# Patient Record
Sex: MALE | Race: WHITE | Employment: FULL TIME | URBAN - METROPOLITAN AREA
[De-identification: names, ages, dates, MRNs, and addresses within clinical notes are randomized per-mention and may not be internally consistent; named-entity substitution may affect disease eponyms.]

---

## 2017-01-01 ENCOUNTER — APPOINTMENT (OUTPATIENT)
Dept: CT IMAGING | Age: 52
DRG: 896 | End: 2017-01-01
Attending: EMERGENCY MEDICINE
Payer: COMMERCIAL

## 2017-01-01 PROBLEM — Z91.81 AT RISK FOR FALLING: Status: ACTIVE | Noted: 2017-01-01

## 2017-01-01 PROBLEM — F10.10 ALCOHOL ABUSE: Status: ACTIVE | Noted: 2017-01-01

## 2017-01-01 PROBLEM — K85.20 ALCOHOL-INDUCED ACUTE PANCREATITIS, UNSPECIFIED COMPLICATION STATUS: Status: ACTIVE | Noted: 2017-01-01

## 2017-01-01 PROBLEM — K85.20 ALCOHOL-INDUCED ACUTE PANCREATITIS: Status: ACTIVE | Noted: 2017-01-01

## 2017-01-01 PROBLEM — G40.909 SEIZURE DISORDER (HCC): Status: ACTIVE | Noted: 2017-01-01

## 2017-01-01 PROCEDURE — 74176 CT ABD & PELVIS W/O CONTRAST: CPT

## 2017-01-01 NOTE — CONSULTS
[facility]    Benedicto Flight Patient Status:  Inpatient    3/7/1965 MRN [id number]   [facility] 4SW-A Attending Pernell Castleman, MD   [facility] Day # 0 PCP Ashu Stephenson OD, OD     Date of Admission: 2017  Admission Diagnosis: Alcohol facility-administered medications:   •  0.9%  NaCl infusion, , Intravenous, Continuous  •  HYDROmorphone HCl PF (DILAUDID) 1 MG/ML injection 0.5 mg, 0.5 mg, Intravenous, Q30 Min PRN  •  LORazepam (ATIVAN) injection 1 mg, 1 mg, Intravenous, Once  •  influen (77.565 kg)  02/04/14 : 180 lb (81.647 kg)                General appearance: alert, appears stated age and cooperative  Head: Normocephalic, without obvious abnormality, atraumatic  Neck: no adenopathy, no carotid bruit, no JVD and supple, symmetrical, tr

## 2017-01-01 NOTE — PROGRESS NOTES
Brief GI Note, full consult to follow    Acute EtOH pancreatitis  Tachy, hypertensive, afebrile. No clear sepsis or SIRS to attribute to pancreatitis, but this will be tough to follow given his withdrawals.     Physical Exam  NAD, alert  Decreased breath s

## 2017-01-01 NOTE — ED NOTES
Pt with notable shaking of hands. Pt refused blanket. Denies alcohol consumption/withdrawl. Discussed shaking with patient who states \"fear of needles\".

## 2017-01-01 NOTE — ED NOTES
PT states he typically drinks a 12 pack of beer. Last drink was \"middle of the night\" in between vomiting episodes.

## 2017-01-01 NOTE — H&P
[facility]  History and Physical     Edith Jerome Patient Status:  Inpatient    3/7/1965 MRN [id number]   [facility] 4SW-A Attending Shelby Villaseñor MD   Hosp Day # 0 PCP Vimal Dupont, OD, OD     Chief Complaint: Patient presen completed. Pertinent positives and negatives noted in the HPI.     Physical Exam:    /103 mmHg  Pulse 110  Temp(Src) 99.3 °F (37.4 °C) (Temporal)  Resp 19  Ht 170.2 cm (5' 7\")  Wt 171 lb (77.565 kg)  BMI 26.78 kg/m2  SpO2 95%  General: No acute di full  · Annie: no    Plan of care discussed with the ER physician    Bird Cash MD  1/1/2017

## 2017-01-01 NOTE — ED INITIAL ASSESSMENT (HPI)
Vomiting since last noc. Pt c/o abdominal cramping. Last normal bowel movement 12/31. No diarrhea. No fever.

## 2017-01-01 NOTE — PLAN OF CARE
GASTROINTESTINAL - ADULT    • Maintains or returns to baseline bowel function Not Progressing    • Maintains adequate nutritional intake (undernourished) Not Progressing        Impaired Functional Mobility    • Achieve highest/safest level of mobility/gait

## 2017-01-01 NOTE — ED PROVIDER NOTES
Patient Seen in: THE [facility] In [place]    History   Patient presents with:  Nausea/Vomiting/Diarrhea (gastrointestinal)  Abdomen/Flank Pain (GI/)    Stated Complaint: vomiting, abd pain    HPI    23-year-old male with a history of h abd pain  Other systems are as noted in HPI. Constitutional and vital signs reviewed. All other systems reviewed and negative except as noted above. PSFH elements reviewed from today and agreed except as otherwise stated in HPI.     Physical Exam components within normal limits   ETHYL ALCOHOL - Abnormal; Notable for the following:     Ethyl Alcohol 4 (*)     All other components within normal limits   CBC W/ DIFFERENTIAL - Abnormal; Notable for the following:     WBC 18.1 (*)     .4 (*) cramping and vomiting that started last PM.   FINDINGS:  This scan performed without IV or oral contrast, with limitations thereof. At the lung base, no sign of pleural effusion, pneumonia, significant atelectasis. Normal heart size.  No significant perica of the right adrenal gland.  Left adrenal gland shows some inflammatory stranding from the pancreatitis, but otherwise appears intrinsically normal. Limited assessment of the urinary bladder which contains only a small amount of urine at the time of scannin MRI followup. 6. Thickened appearance of the left colon; this may reflect adjacent pancreatitis secondarily affecting the colon, or colitis independent of the pancreatitis.   Dictated by: Cielo Márquez MD on 1/01/2017 at 11:24     Approved by: Ayde Godfrey

## 2017-01-02 ENCOUNTER — APPOINTMENT (OUTPATIENT)
Dept: GENERAL RADIOLOGY | Facility: HOSPITAL | Age: 52
DRG: 896 | End: 2017-01-02
Attending: NURSE PRACTITIONER
Payer: COMMERCIAL

## 2017-01-02 PROCEDURE — 71010 XR CHEST AP PORTABLE  (CPT=71010): CPT

## 2017-01-02 NOTE — BH PROGRESS NOTE
Went to see the pt for the shanda assessment. Pt is not assessable today will check on him tomorrow.

## 2017-01-02 NOTE — PLAN OF CARE
GASTROINTESTINAL - ADULT    • Minimal or absence of nausea and vomiting Progressing        NEUROLOGICAL - ADULT    • Achieves stable or improved neurological status Progressing    • Absence of seizures Progressing        Assumed care of patient after recei

## 2017-01-02 NOTE — PAYOR COMM NOTE
Attending Physician: Allen Long MD    Review Type: ADMISSION   Reviewer: Harpal Smith       Date: January 2, 2017 - 11:05 AM  Payor: [employer]  Authorization Number: [id number]  Admit date: 1/1/2017 10:21 AM   Admitted from Emergency Dept.:neo appearance of the distal esophagus. Possible small hiatal hernia. Old posterior left rib fractures.      Findings present consistent with acute pancreatitis.  Swollen, edematous appearance of the pancreas, involving the pancreatic body, and to a lesser exte scanning. Moderate stool the colon. No signs of colonic obstruction, small bowel structure, or free air. The gallbladder appears contracted.      Low-attenuation masslike area seen in the central liver, for example image 24 measuring 4.9 x 4.4 CM.  The marcin ADMINISTERED IN LAST 1 DAY:  Enoxaparin Sodium (LOVENOX) 40 MG/0.4ML injection 40 mg     Date Action Dose Route User    1/2/2017 0911 Given 40 mg Subcutaneous (Left Lower Abdomen) Krissy Hernandez RN    1/1/2017 1536 Given 40 mg Subcutaneous (Right Lower A 1/2/2017 0519 Given 25 mg Oral Alfredo Jacome, EVAN    1/1/2017 1752 Given 25 mg Oral Mayra Ruff, EVAN      ondansetron HCl [facility]) injection 4 mg     Date Action Dose Route User    1/1/2017 2200 Given 4 mg Intravenous Yanira Marino, RN          RES WBC 18.2 (*)     RBC 4.13 (*)     .0 (*)     .6 (*)     MCH 36.6 (*)     RDW-SD 57.3 (*)     Neutrophil Absolute Prelim 16.71 (*)     Neutrophil Absolute 16.71 (*)     Lymphocyte Absolute 0.41 (*)     Monocyte Absolute 0.88 (*)     All oth B12, MMA and folate        :

## 2017-01-02 NOTE — CONSULTS
Gastroenterology Initial Consultation  I have personally seen and examined the patient.     Patient Name: Geovanna Violeta  Referring physician: Dr Martina Boxer  Reason for consultation: pancreatitis  CC: abdominal pain  HPI: Mr Safia Hinojosa is a 50 yo male with history depression, anxiety, suicidal ideation, or homicidal ideation           Oncologic: The patient reports on history of prior solid tumor or hematologic malignancy           ENT: The patient reports no hoarseness of voice, hearing loss, sinus congestion, tinn reviewed  Inflammatory changes noted around the body and tail without obvious necrosis (limited by lack of CT)    Impression: Mr Perri Vann is a 50 yo alcoholic, here with acute EtOH pancreatitis and is high risk for withdrawals (history of withdrawals previo

## 2017-01-02 NOTE — CM/SW NOTE
Patient admitted for ETOH pancreatitis. [facility] will see for resources. MSW will defer for now but will remain available for any dc planning needs that may arise.     Alvin Vanegas LCSW

## 2017-01-02 NOTE — PROGRESS NOTES
Pulmonary Progress Note        NAME: Anton Blojuliet - ROOM: 816/469-D - MRN: [id number] - Age: 49year old - : 3/7/1965        SUBJECTIVE: No events overnight, seems confused this AM    OBJECTIVE:   17  0600 17  0700 17  0800 17 01/02/17  0511   ALT 28 19   AST 40 33   ALB 3.4* 2.7*       Invalid input(s): ARTERIALPH, ARTERIALPO2, ARTERIALPCO2, ARTERIALHCO3    No results for input(s): BNP in the last 72 hours.     Invalid input(s): TROPI      INR   Date/Time Value Ref Range Status

## 2017-01-02 NOTE — PROGRESS NOTES
[facility]  Progress Note    Gabe Mcclellan Patient Status:  Inpatient    3/7/1965 MRN [id number]   [facility] 4SW-A Attending Humphrey Sainz MD   [facility] Day # 1 PCP Savannah Doe, OD, OD     Subjective:  Gabe Mcclellan is a(n) 49 yea tomorrow  NPO for now  Watch and manage for withdrawal  IV fluid and electrolyte replacement  Repeat CMP, CBC in AM    Total time spent in the care of the patient today: 35 minutes.     Conchita Bullock  1/2/2017  10:56 AM

## 2017-01-02 NOTE — PROGRESS NOTES
[facility]  Progress Note     Serena Zarco Patient Status:  Inpatient    3/7/1965 MRN [id number]   [facility] [place] 4SW-A Attending Gagan Zapata, [address] [facility] [address] Day # 1 PCP Arron Farrar, OD, OD     Chief Complaint: pancreatitis    S: Armen Pritchardcy HCl  25 mg Oral TID   • nicotine  1 patch Transdermal Daily   • metoprolol tartrate  25 mg Oral 2x Daily(Beta Blocker)       ASSESSMENT / PLAN:     1. Etoh abuse and w/d  1. CIWA protocol  2. Librium  3. May need precedex drip  2.  Acute Etoh Induced Pancre

## 2017-01-03 ENCOUNTER — APPOINTMENT (OUTPATIENT)
Dept: CT IMAGING | Facility: HOSPITAL | Age: 52
DRG: 896 | End: 2017-01-03
Attending: INTERNAL MEDICINE
Payer: COMMERCIAL

## 2017-01-03 PROCEDURE — 74170 CT ABD WO CNTRST FLWD CNTRST: CPT

## 2017-01-03 NOTE — PROGRESS NOTES
[facility]  Progress Note     Arnell Cockayne Patient Status:  Inpatient    3/7/1965 MRN [id number]   [facility] 4SW-A Attending Zena Mccartney, [address] [facility] [address] Day # 2 PCP Vasiliy Schmitz, OD, OD     Chief Complaint: pancreatitis    S: Lorenda Sandra levETIRAcetam (KEPPRA) IVPB  250 mg Intravenous Q12H   • metoprolol Tartrate  5 mg Intravenous Q6H   • enoxaparin  40 mg Subcutaneous Daily   • famoTIDine  20 mg Per NG Tube BID    Or   • famoTIDine  20 mg Intravenous BID   • multivitamin/thiamine/folic ac

## 2017-01-03 NOTE — BH PROGRESS NOTE
Talked with the patients nurse and she said the pt is still on precedex. Unable to assess and will check on him tomorrow.

## 2017-01-03 NOTE — PROGRESS NOTES
[facility]  Progress Note    Leno Go Patient Status:  Inpatient    3/7/1965 MRN [id number]   [facility] 4SW-A Attending Rick Madrid MD   [address] Day # 2 PCP Serg Leblanc, OD, OD     Subjective:  Leno Go is a(n) 49 yea pancreatitis with suggestion of necrosis, etoh induced  2. Etoh abuse  3.  Etoh withdrawal    Plan:  NPO for now  Manage for withdrawal  IV fluid and electrolyte replacement  Will follow peripherally  CT pancreas protocol in 1 month  Call if any questions

## 2017-01-03 NOTE — PLAN OF CARE
NEUROLOGICAL - ADULT    • Achieves stable or improved neurological status Not Progressing          CARDIOVASCULAR - ADULT    • Maintains optimal cardiac output and hemodynamic stability Progressing        NEUROLOGICAL - ADULT    • Absence of seizures Progr

## 2017-01-03 NOTE — PROGRESS NOTES
Critical Care Progress Note        NAME: Carmelita Soulier - ROOM: 597/079-Q - MRN: [id number] - Age: 49year old - : 3/7/1965  Date of Admission: 2017 10:21 AM  Admission Diagnosis: Alcohol abuse [F10.10]  Seizure disorder ([facility] [address] [G40.909]  Alcohol-luana **OR** HYDROmorphone HCl PF     Lungs: clear to auscultation bilaterally  Heart: S1, S2 normal, no murmur, click, rub or gallop, regular rate and rhythm  Abdomen: soft, non-tender; bowel sounds normal; no masses,  no organomegaly  Extremities: extremities

## 2017-01-03 NOTE — PLAN OF CARE
GASTROINTESTINAL - ADULT    • Maintains or returns to baseline bowel function Not Progressing    • Maintains adequate nutritional intake (undernourished) Not Progressing          CARDIOVASCULAR - ADULT    • Maintains optimal cardiac output and hemodynamic

## 2017-01-04 NOTE — PLAN OF CARE
CARDIOVASCULAR - ADULT    • Maintains optimal cardiac output and hemodynamic stability Progressing    • Absence of cardiac arrhythmias or at baseline Progressing        GASTROINTESTINAL - ADULT    • Minimal or absence of nausea and vomiting Progressing

## 2017-01-04 NOTE — BH PROGRESS NOTE
Went to see the pt and his fiancee was at the bedside. The pt was moaning and asking about his underwear. Patient is not alert enough to complete the shanda comprehensive assessment. Will check on him tomorrow. The nurse is aware.

## 2017-01-04 NOTE — PROGRESS NOTES
[facility]    Nahed Maravilla Patient Status:  Inpatient    3/7/1965 MRN [id number]   [facility] 4SW-A Attending Amy Chinchilla [facility] Day # 3 PCP Myrna Munguia, OD, OD     Pulm / Critical Care Progress Note     S: remains on tenderness.  Dec bs   Extremity: no edema         Recent Labs   Lab  01/01/17   1042  01/02/17   0511  01/03/17   0441   WBC  18.1*  18.2*  11.8   HGB  16.3  15.1  11.8*   HCT  47.3  43.2  35.1*   PLT  243.0  136.0*  113.0*     Recent Labs   Lab  01/01/17

## 2017-01-04 NOTE — PLAN OF CARE
CARDIOVASCULAR - ADULT    • Maintains optimal cardiac output and hemodynamic stability Progressing        GASTROINTESTINAL - ADULT    • Minimal or absence of nausea and vomiting Progressing        NEUROLOGICAL - ADULT    • Achieves stable or improved neuro

## 2017-01-04 NOTE — PAYOR COMM NOTE
Attending Physician: Laury Bates*    Review Type: CONTINUED STAY  Reviewer: Cesar Solares     Date: January 4, 2017 - 11:49 AM  [facility]  Authorization Number: [id number]  Admit date: 1/1/2017 10:21 AM        1/3/17    Physi

## 2017-01-04 NOTE — PROGRESS NOTES
[facility]  Progress Note    Chase Mendoza Patient Status:  Inpatient    3/7/1965 MRN [id number]   [facility] 4SW-A Attending Maya Maddox MD   [facility] Day # 3 PCP Natalya Crowley, OD, OD     Subjective:  Chase Mendoza is a(n) 49 yea

## 2017-01-05 NOTE — PLAN OF CARE
PT MORE CONFUSED THIS AM PER FIANCE. DELAYED ANSWERS & SOME IRRITATION NOTED. STATES HAVING ABDOMINAL PAIN. UP TO CHAIR FOR APPROX. 1 HR. UNSTEADY. [place] CATH IN PLACE.

## 2017-01-05 NOTE — PROGRESS NOTES
[facility]  Progress Note     Carmelita Soulier Patient Status:  Inpatient    3/7/1965 MRN [id number]   [facility] 4SW-A Attending [facility] Day # 3 PCP Negra Bains, OD, OD     Chief Complaint: Pancreatitis mg Per NG Tube BID    Or   • famoTIDine  20 mg Intravenous BID   • multivitamin/thiamine/folic acid infusion   Intravenous Q24H   • ChlordiazePOXIDE HCl  25 mg Oral TID   • nicotine  1 patch Transdermal Daily       ASSESSMENT / PLAN:     1.  Etoh abuse and

## 2017-01-05 NOTE — PROGRESS NOTES
[facility]  Progress Note    Fredrick Rollins Patient Status:  Inpatient    3/7/1965 MRN [id number]   [facility] 4SW-A Attending Biju Carvajal MD   [facility] Day # 4 PCP Dav Webster, OD, OD     Subjective:  Fredrick Rollins is a(n) 49 yea pancreatitis     Alcohol-induced acute pancreatitis, unspecified complication status     Alcohol abuse     Seizure disorder ([facility] [address]     At risk for falling     Pancreatic necrosis      Impression:  1.  Acute pancreatitis with suggestion of necrosis, etoh luana

## 2017-01-05 NOTE — BH PROGRESS NOTE
Went to see the pt and he said he drinks daily but is not interested in treatment. His fiancee was at his bedside and she was given referrals for cd treatment in case he changes his mind. His nurse is aware.

## 2017-01-05 NOTE — PROGRESS NOTES
Critical Care Progress Note        NAME: Nabil Santiago - ROOM: [address] [place] [address] - MRN: [id number] - Age: 49year old - : 3/7/1965  Date of Admission: 2017 10:21 AM  Admission Diagnosis: Alcohol abuse [F10.10]  Seizure disorder ([facility] [address] [G40.909]  Alcohol-luana influenza virus vaccine PF, LORazepam, LORazepam, LORazepam, LORazepam, ondansetron HCl, Labetalol HCl, HYDROmorphone HCl PF **OR** HYDROmorphone HCl PF     Lungs: clear to auscultation bilaterally  Heart: S1, S2 normal, no murmur, click, rub or gallop, re

## 2017-01-05 NOTE — PROGRESS NOTES
[facility]  Progress Note     Emory Solis Patient Status:  Inpatient    3/7/1965 MRN [id number]   [facility] Serafin Gibson, OD, OD     Chief Complaint: Pancreatitis Followed by   • potassium chloride  20 mEq Intravenous Once   • levETIRAcetam (KEPPRA) IVPB  250 mg Intravenous Q12H   • metoprolol Tartrate  5 mg Intravenous Q6H   • enoxaparin  40 mg Subcutaneous Daily   • famoTIDine  20 mg Per NG Tube BID    Or   • famo

## 2017-01-05 NOTE — PLAN OF CARE
CARDIOVASCULAR - ADULT    • Maintains optimal cardiac output and hemodynamic stability Progressing    • Absence of cardiac arrhythmias or at baseline Progressing        GASTROINTESTINAL - ADULT    • Maintains or returns to baseline bowel function Progressi

## 2017-01-06 NOTE — PLAN OF CARE
CARDIOVASCULAR - ADULT    • Maintains optimal cardiac output and hemodynamic stability Progressing        GASTROINTESTINAL - ADULT    • Minimal or absence of nausea and vomiting Progressing        METABOLIC/FLUID AND ELECTROLYTES - ADULT    • Electrolytes

## 2017-01-06 NOTE — PAYOR COMM NOTE
Attending Physician: Laury Bates*    1/5  CONTINUED STAY    PANCREATITIS, TRANSIET NAUSEA  AFTER CLEARS LAST NITE           PHOS  3.0  01/05/2017      CT: Marked pancreatitis with suggestion of necrosis involving the pancreatic body and tail i

## 2017-01-06 NOTE — PROGRESS NOTES
[facility]  Progress Note    Nelli Dubkevontima Patient Status:  Inpatient    3/7/1965 MRN [id number]   [facility] 4SW-A Attending Roswell Sicard, MD   [facility] Day # 5 PCP Ebony Hammans, OD, OD     Subjective:  Nelli Fuentes is a(n 49 yea minutes.     Wing Berna MARTINES  [facility] Gastroenterology  1/6/2017  3:27 PM

## 2017-01-06 NOTE — PROGRESS NOTES
Received patient at 0730  Patient more alert and oriented x 4 this morning with some mild confusion  Patient following directions and cooperative this shift. Patient tolerating CLD, advanced up to soft diet, will monitor.   GI signed off on patient for [facility]

## 2017-01-06 NOTE — CM/SW NOTE
SW received order for pending PT eval and met with pt for d/c planning. Pt is a 49 y.o male admitted on 01/01 with dx of ETOH-induced pancreatitis. Pt has no previous admissions per chart review. Pt has h/o seizure d/o.  Pt is A&O with some confusion and c

## 2017-01-06 NOTE — PHYSICAL THERAPY NOTE
PHYSICAL THERAPY EVALUATION - INPATIENT     Room Number: 5834/6014-A  Evaluation Date: 1/6/2017  Type of Evaluation: Initial  Physician Order: PT Eval and Treat    Presenting Problem: etoh induced pancreatitis  Reason for Therapy: Mobility Dysfunction commands with increased time and follows one step commands with repetition  · Safety Judgement:  decreased awareness of need for assistance and decreased awareness of need for safety  · Awareness of Errors:  assistance required to identify errors made and bed to a chair (including a wheelchair)?: A Lot   -   Need to walk in hospital room?: A Lot   -   Climbing 3-5 steps with a railing?: Total     AM-PAC Score:  Raw Score: 16   PT Approx Degree of Impairment Score: 54.16%   Standardized Score (AM-PAC Scale): inpatient PT to address the above deficits to assist patient in returning to prior level of function. Subacute rehab is recommended for 12-14 days. After this period of rehabilitation patient should achieve mod ind level in all fxal mobility.       Tennis Carbine

## 2017-01-06 NOTE — PROGRESS NOTES
[facility]  Progress Note     Scott Evens Patient Status:  Inpatient    3/7/1965 MRN [id number]   [facility] 4SW-A Attending Dana Roblero [facility] Day # 5 PCP Mayte Gaines, OD, OD     Chief Complaint: Pancreatitis folic acid  1 mg Oral Daily   • levETIRAcetam (KEPPRA) IVPB  250 mg Intravenous Q12H   • metoprolol Tartrate  5 mg Intravenous Q6H   • enoxaparin  40 mg Subcutaneous Daily   • famoTIDine  20 mg Per NG Tube BID    Or   • famoTIDine  20 mg Intravenous BID

## 2017-01-07 NOTE — PROGRESS NOTES
[facility]  Progress Note     Arnell Cockayne Patient Status:  Inpatient    3/7/1965 MRN [id number]   [facility] 4SW-A Attending [facility] Day # 6 PCP Vasiliy Schmitz, OD, OD     Chief Complaint: Pancreatitis potassium chloride 40mEq IVPB (peripheral line)  40 mEq Intravenous Once   • levETIRAcetam  250 mg Oral BID   • Propranolol HCl  10 mg Oral TID   • lisinopril  10 mg Oral Daily   • folic acid  1 mg Oral Daily   • enoxaparin  40 mg Subcutaneous Daily   • fa

## 2017-01-07 NOTE — PLAN OF CARE
CARDIOVASCULAR - ADULT    • Maintains optimal cardiac output and hemodynamic stability Progressing    • Absence of cardiac arrhythmias or at baseline Progressing        GASTROINTESTINAL - ADULT    • Minimal or absence of nausea and vomiting Progressing reapply posey vest for 24hrs. Vest applied to pt. Will continue to monitor patient.

## 2017-01-08 NOTE — DISCHARGE SUMMARY
[facility]  DISCHARGE SUMMARY     Scott Horner Patient Status:  Inpatient    3/7/1965 MRN [id number]   [facility] 3NE-A Attending No att. providers found   Hosp Day # 7 PCP Mayte Gaines, OD, OD     Date of Admission: 2017  Da due to needing to be on CIWA protocol with higher CIWA scores. Patient will next couple days slowly continued to improve and was stable enough with his CIWA scores to be transferred to the behavioral med unit.   Patient still kind of foggy but slowly impro Quantity:  30 capsule   Refills:  0       folic acid 1 MG Tabs   Last time this was given:  1 mg on 1/8/2017  8:59 AM   Commonly known as:  FOLVITE        Take 1 tablet (1 mg total) by mouth daily.     Quantity:  30 tablet   Refills:  0         CONTINUE mitesh gallops. Abdomen: Soft, nontender, nondistended. Positive bowel sounds. No rebound or guarding. Neurologic: No focal neurological deficits. Musculoskeletal: Moves all extremities. Extremities: No edema.   ---------------------------------------------

## 2017-01-08 NOTE — PLAN OF CARE
NURSING DISCHARGE NOTE    Discharged Home via Wheelchair. Accompanied by Family member and Support staff  Belongings Taken by patient/family. Pt discharged home. Refusing rehab. IV removed. Pt given discharge paperwork and understands.  Prescription

## 2017-01-08 NOTE — CM/SW NOTE
ZELDA notified of pt d/c today and met with pt regarding d/c plan. Pt continues to refuse [facility] and home health but agreeable to outpatient PT. ZELDA printed AT[facility] list of locations in [facility] and provided to pt.

## 2017-01-08 NOTE — BH PROGRESS NOTE
0525Ryann Deem seen Pt  1/5/17 and pt declined services. Spoke with RN Shamir Malik still is declining [facility] services and notified RN wants to go home. [facility] doesn't need to see.

## 2017-01-09 NOTE — CM/SW NOTE
Pt d/c 01/08 home refusing [address].       01/09/17 0900   Discharge disposition   Discharged to: Home or Self   Home services after discharge Patient refused services   Patient assessed for rehabilitation services?  Yes   Patient Refuses Rehab Services Yes

## 2017-01-17 ENCOUNTER — PATIENT MESSAGE (OUTPATIENT)
Dept: FAMILY MEDICINE CLINIC | Facility: CLINIC | Age: 52
End: 2017-01-17

## 2017-01-17 NOTE — TELEPHONE ENCOUNTER
From: Mandy Rice  To: Margarita Mora MD  Sent: 1/17/2017 2:17 PM CST  Subject: Other    I received a menu of foods that I can or can't eat. Unfortunately it was accidentally thrown away. I was wondering if you were the doctor who made out this menu.  If

## 2021-12-10 ENCOUNTER — APPOINTMENT (OUTPATIENT)
Dept: CT IMAGING | Age: 56
DRG: 439 | End: 2021-12-10
Attending: PHYSICIAN ASSISTANT
Payer: MEDICAID

## 2021-12-10 PROCEDURE — 74177 CT ABD & PELVIS W/CONTRAST: CPT | Performed by: PHYSICIAN ASSISTANT

## 2021-12-11 NOTE — ED QUICK NOTES
Report given to CHERYLE Valdivia. Pt to be transported via ALS ambulance to [facility] for inpatient admission.

## 2021-12-11 NOTE — ED INITIAL ASSESSMENT (HPI)
Patient arrives from home with c/o nausea, vomiting, and abdominal pain states had a dental procedure yesterday

## 2021-12-11 NOTE — PROGRESS NOTES
NURSING ADMISSION NOTE      Patient admitted via Ambulance  Oriented to room. Safety precautions initiated. Bed in low position. Call light in reach. Pt here from [place] ED. Admission navigator completed. Med rec verified.  MD paged for admiss

## 2021-12-11 NOTE — H&P
[facility]  History and Physical     Fredrick Rollins Patient Status:  Inpatient    3/7/1965 MRN [id number]   [facility] 3NE-A Attending Biju Carvajal MD   Hosp Day # 0 PCP Dav Webster, OD, OD     Chief Complaint: abdominal pain night x 3 days then stop, Disp: 30 capsule, Rfl: 0  levETIRAcetam 250 MG Oral Tab, Take 1 tablet (250 mg total) by mouth 2 (two) times daily. , Disp: 60 tablet, Rfl: 6  Propranolol HCl 10 MG Oral Tab, Take 10 mg by mouth 4 (four) times daily as needed. , Dis in the last 168 hours. Cardiac  No results for input(s): TROP, PBNP in the last 168 hours. Creatinine Kinase  No results for input(s): CK in the last 168 hours.     Inflammatory Markers  No results for input(s): CRP, ELSY, LDH, DDIMER in the last 168 h

## 2021-12-11 NOTE — ED QUICK NOTES
Orders for admission, patient is aware of plan and ready to go upstairs. Any questions, please call ED EVAN Valdivia  at extension [phone]. Vaccinated? NO. States not interested. Type of COVID test sent: RAPID - NEGATIVE.   COVID Suspicion level: Low      Titr

## 2021-12-11 NOTE — ED PROVIDER NOTES
Patient Seen in: [facility] Emergency Department In [place]      History   Patient presents with:  Abdomen/Flank Pain    Stated Complaint: vomiting    Subjective:   HPI    60-year-old male presents for evaluation of vomiting.   Patient states he has had mu normocephalic. Pupils equal reactive. Extraocular motions intact. Oropharynx clear. Neck: Supple  Lungs: Clear to auscultation bilaterally. Heart: Regular rate and rhythm. Abdomen: Soft, nontender. Skin: No rash. No edema.   Neurologic: No focal n (OMNIPAQUE) 350 MG/ML injection 100 mL (100 mL Intravenous Given 12/10/21 2101)       CT ABDOMEN+PELVIS(CONTRAST ONLY)(CPT=74177)    Result Date: 12/10/2021  PROCEDURE:  CT ABDOMEN+PELVIS (CONTRAST ONLY) (CPT=74177)  COMPARISON:  [facility] , CT, CT ABDOMEN (W the pancreatic head and uncinate process suggestive pancreatitis. Correlation pancreatic enzymes is recommended. 2. Pancreatic calcifications as well as atrophy of the tail which is likely from prior pancreatitis. 3. Hepatic steatosis.  4. Bladder wall th

## 2021-12-12 NOTE — PROGRESS NOTES
[facility]     Hospitalist Progress Note     Roland Ribeiro Patient Status:  Inpatient    3/7/1965 MRN [id number]   [facility] 3NE-A Attending Roberto Sutton MD   Hosp Day # 1 PCP Carmen Skelton, OD, OD     Chief Complaint: Marciano Nuñez Epic.    Medications:   • levETIRAcetam  250 mg Oral BID   • lisinopril  10 mg Oral Daily   • enoxaparin  40 mg Subcutaneous Daily   • multivitamin/thiamine/folic acid infusion   Intravenous Q24H   • Insulin Aspart Pen  1-10 Units Subcutaneous TID AC and H

## 2021-12-12 NOTE — PROGRESS NOTES
Assumed care at 0700, A/Ox4, Calm and pleasant first half of shift. Agitation increased as day went on. NSR:ST w/SVT related to agitation at end of shift. R/A. . Gauri Myers. Oral ativan 1mgx2 and IVativan 2mgx1 given. 6 pack a day drinker.  ETOH pancreatitis but d

## 2021-12-12 NOTE — PROGRESS NOTES
Pt fell asleep and when he woke up he changed his mind, stating he wanted to stay  Pt told that he needed to comply with the hospital plan of care  Ativan 1mg IV given  Placed on tele monitor  Hr noted to be sinus tach low 100s  IVF per [facility]  Bed alarm on f

## 2021-12-12 NOTE — PROGRESS NOTES
Pt agitated, getting dressed, stating he wants to leave, too many wires. RN asked patient if he wanted ativan to help him calm down as he was probably withdrawing the patient declined. He is on CIWA protocol.  Seizure precautions, the patient is A/O x 4, de

## 2021-12-12 NOTE — PROGRESS NOTES
ra   Refusal of tele and o2 monitoring  A/ox4   continent   No complaints of pain   Small outburst with irritation   Safety precautions in place fall risk seizure precautions  Spoke to physician- discharge pending. ...   Staff will continue to monitor

## 2021-12-14 NOTE — PAYOR COMM NOTE
--------------  ADMISSION REVIEW     Payor: [employer] [facility]  Subscriber #:  [id number]  Authorization Number: [id number]    Admit date: 12/11/21  Admit time:  1:17 AM       REVIEW DOCUMENTATION:     ED Provider Notes      ED Provider Notes signed b 1956 103   Resp 12/10/21 1956 20   Temp 12/10/21 1947 97.8 °F (36.6 °C)   Temp src 12/10/21 1947 Temporal   SpO2 12/10/21 1956 98 %   O2 Device 12/10/21 1947 None (Room air)       Current:BP (!) 179/88   Pulse 100   Temp 97.8 °F (36.6 °C) (Temporal)   Resp Please view results for these tests on the individual orders.    URINALYSIS WITH CULTURE REFLEX               MDM          Medications   sodium chloride 0.9% IV bolus 1,000 mL (0 mL Intravenous Stopped 12/10/21 2037)   ondansetron (ZOFRAN) injection 4 m BOWEL/MESENTERY:  Bowel is normal in caliber. No evidence of obstruction. PELVIS:  Bladder wall thickening. Prostatic calcifications. Mild prostatomegaly measuring 5.1 x 3.6 cm. No free pelvic fluid. AORTA/VASCULAR:  Aorta is normal in caliber.   Athe Chief Complaint: abdominal pain    History of Present Illness: Melissa Rashid is a 55year old male with pmhx of pancreatitis, DM2, asthma, seizure disorder, HTN who present with acute onset generalized abdominal pain that started abruptly yesterday.  R (36.9 °C)   Resp 18   Wt 171 lb (77.6 kg)   SpO2 96%   BMI 26.78 kg/m²   General: No acute distress. Alert and oriented x 3. HEENT: Normocephalic atraumatic. Moist mucous membranes. EOM-I. PERRLA. Anicteric. Neck: No lymphadenopathy. No JVD.  No carotid b medications  - hyperglycemia protocol, ICF  - HgbA1c ordered    #Hx of seizure disorder - keppra    #HTN  - lisinopril    Quality:  · DVT Prophylaxis: lovenox  · CODE status: FULL  · [place]: no  · If COVID testing is negative, may discontinue isolation: yes before discharge     Date/Time KARLAWA-Ar Total Who    12/12/21 1200 0 CC    12/12/21 1000 0 CC    12/12/21 0800 1 CC    12/12/21 0600 7 HB    12/12/21 0400 3 HB    12/12/21 0200 5 HB    12/12/21 0000 0 HB    12/11/21 2300 7 HB    12/11/21 2000 9 HB    12/11/

## 2021-12-22 ENCOUNTER — PATIENT OUTREACH (OUTPATIENT)
Dept: CASE MANAGEMENT | Age: 56
End: 2021-12-22

## 2021-12-22 NOTE — PROGRESS NOTES
1st Attempt at scheduling       [facility] [facility]   [address]   [address] [id number]         Called patients phone     Unable to contact     Phone was answered Went silent then hung up

## 2021-12-23 NOTE — PROGRESS NOTES
2nd Attempt at scheduling       Krystyna Faith, [address] [facility]   [address]    [facility] [place] [phone]         Spoke with patient advised hes feeling better would not like to schedule at this time

## 2021-12-28 NOTE — DISCHARGE SUMMARY
[facility]  DISCHARGE SUMMARY     [facility] Patient Status:  Inpatient    3/7/1965 MRN [id number]   [facility] 3NE-A Attending No att. providers found   Hosp Day # 1 PCP Magdiel Dupont OD, OD     Date of Admission: 12/10/2021 metFORMIN HCl 1000 MG Tabs  Commonly known as: GLUCOPHAGE      Take 1 tablet (1,000 mg total) by mouth 2 (two) times daily with meals.    Quantity: 60 tablet  Refills: 0        CONTINUE taking these medications      Instructions Prescription details   chl edema.  -----------------------------------------------------------------------------------------------  PATIENT DISCHARGE INSTRUCTIONS: See electronic chart    Sayra Garza MD    Time spent:  > 30 minutes

## (undated) NOTE — IP AVS SNAPSHOT
[facility] [place] [address] [place], [address] ~ [phone]                Discharge Summary   1/1/2017    [place]           Admission Information        Provider Department    1/1/2017 Alexander Booker DO  3 Take 1 tablet (250 mg total) by mouth 2 (two) times daily.     Ria Epperson                              lisinopril 10 MG Tabs   Last time this was given:  10 mg on 1/8/2017  8:58 AM   Commonly known as:  PRINIVIL,ZESTRIL   Next dose due:  1- WBC RBC Hemoglobin Hematocrit MCV MCH MCHC RDW Platelet MPV    (73/04/23)  6.9 (01/07/17)  3.19 (L) (01/07/17)  11.5 (L) (01/07/17)  33.3 (L) (01/07/17)  104.4 (H) (01/07/17)  36.1 (H) (01/07/17)  34.5 -- (01/07/17)  196.0 --    (01/05/17)  7.7 (01/05/17) All belongings returned to patient at discharge Pt's bedside belongings    Medications Sent Home None to return    Medications Returned:           Additional Information       We are concerned for your overall well being:    - If you are a smoker or have s As your caregivers, we want you to be aware of the medications you are prescribed to take and their potential SIDE EFFECTS. Your nurse will review your medications with you before you are discharged, and can provide you with additional printed information.